# Patient Record
(demographics unavailable — no encounter records)

---

## 2024-10-31 NOTE — PLAN
[FreeTextEntry1] : start topamax 25mg nightly for one week then 50mg nightly continue Magnesium 250mg nightly and start Vitamin B2 (riboflavin) 200mg nightly naproxen 275mg BID as needed for headaches take rizatriptan 5 mg as needed for severe headaches, can repeat in 2 hours, do not take more than twice a day and no more than 2 days a week  Lifestyle Goals:  Regular sleep/waking times (on both weekdays and weekends) - Children 3-4yo:10-13 hrs; 6-13yo: 9-12 hrs; teens 13+: 8-10 hrs  Regular exercise - 30 mins a day, 5 days a week  Regular meals (protein rich breakfast within 30 min of waking and no skipping meals)  Stay hydrated (1 ounce/kg body weight, 8-10 cups of water per day for teens)  Can refer to www.headachereliefguide.com  for more information on healthy habits

## 2024-10-31 NOTE — HISTORY OF PRESENT ILLNESS
[Previous Imaging] : yes [FreeTextEntry1] : headaches started about 2 years ago, nothing significant happened at the time became more frequent over the summer and about a month ago they became daily about a month ago, did a course of steroids and the headaches improved but then they came back pain located on both sides of the temples, can be right side or left side described as pressure pain duration of headaches 1-2 days at a time frequency of headaches every other day    associated symptoms: +nausea/vomiting, +photophobia   treated with: motrin 15ml (300mg) (used to help but does not anymore), sumatriptan 25mg (initially helped but no side effects)   aura? none   other symptoms with headaches- dizziness   positional component? sometimes wake him up at night    triggers: chocolate  previous work up: imaging- brain MRI done in may 2024 normal blood work- normal per mom ophtho- yes normal exam   previous acute medications: motrin, tylenol    previous prevention medications: amitriptyline 10mg (was making him nauseas so he stopped it), magnesium 250mg about a month ago   lifestyle: 9 hours of sleep yes breakfast (but not recently because of the headaches) 7-8 cups of water  [Head Trauma] : no head trauma [Infections] : no infections [Stressors] : no stressors

## 2024-10-31 NOTE — ASSESSMENT
[FreeTextEntry1] : 12yo male with no significant pmh who is here for initial evaluation of headaches that started about 2 years ago and most recently have become more frequent. Headaches are meeting criteria for migraines without aura, now have been refractory to some prevention and abortive medications. Neurological exam non focal. Discussed improving prevention and abortive treatment.